# Patient Record
Sex: MALE | Race: WHITE
[De-identification: names, ages, dates, MRNs, and addresses within clinical notes are randomized per-mention and may not be internally consistent; named-entity substitution may affect disease eponyms.]

---

## 2019-07-21 ENCOUNTER — HOSPITAL ENCOUNTER (EMERGENCY)
Dept: HOSPITAL 72 - EMR | Age: 39
Discharge: HOME | End: 2019-07-21
Payer: SELF-PAY

## 2019-07-21 VITALS — SYSTOLIC BLOOD PRESSURE: 106 MMHG | DIASTOLIC BLOOD PRESSURE: 71 MMHG

## 2019-07-21 VITALS — DIASTOLIC BLOOD PRESSURE: 71 MMHG | SYSTOLIC BLOOD PRESSURE: 106 MMHG

## 2019-07-21 VITALS — WEIGHT: 170 LBS | BODY MASS INDEX: 25.18 KG/M2 | HEIGHT: 69 IN

## 2019-07-21 DIAGNOSIS — N50.89: Primary | ICD-10-CM

## 2019-07-21 PROCEDURE — 96372 THER/PROPH/DIAG INJ SC/IM: CPT

## 2019-07-21 PROCEDURE — 99283 EMERGENCY DEPT VISIT LOW MDM: CPT

## 2019-07-21 NOTE — NUR
ED Nurse Note:

Patient medicated for pain and to decrease circulation to affected area. 
Patient tolerated injection well.

## 2019-07-21 NOTE — NUR
ED Nurse Note:

Patient able to ambulate to restroom and void. Patient assisted with lubricant 
to free affected area with no progress. Patient supplied with ice packs to 
decrease swelling in affected area. Will continue to monitor.

## 2019-07-21 NOTE — NUR
ED Nurse Note:

Patient presents with complaints of stuck genital ring, x 1 day. Patient 
reports pain 8/10.

## 2019-07-21 NOTE — NUR
ED Nurse Note:



recieved pt on bed, paramedics on bedside removing the penile ring by a 
mechanical cutter, pt able to tolerate without pain on the process. penile ring 
carefully cut and successfully removed.

## 2019-07-21 NOTE — NUR
ED Nurse Note:

Patient growing increasingly frustrated, is resting comfortably awaiting 
recommendation from [facility].

## 2019-07-21 NOTE — NUR
ED Nurse Note:

Patient reports decreased pain and throbbing sensation post medication 
administration.

## 2019-07-21 NOTE — EMERGENCY ROOM REPORT
History of Present Illness


General


Chief Complaint:  General Complaint


Source:  Patient





Present Illness


HPI


Patient is a 39-year-old male who presented after increased swelling to his 

genitalia.  Patient had placed a large metallic ring around his penis and 

scrotum.  This is becoming increasingly swollen.  He had been able to remove 

this for the past 2 days.  He denies any other current complaints.  Patient had 

been having some continued sensation to the area.  He denies any fever.


Allergies:  


Coded Allergies:  


     No Known Allergies (Unverified , 7/21/19)





Patient History


Reviewed Nursing Documentation:  PMH: Agreed; PSxH: Agreed





Nursing Documentation-PMH


Hx Neurological Problems:  Yes - HI5





Review of Systems


All Other Systems:  negative except mentioned in HPI





Physical Exam





Vital Signs








  Date Time  Temp Pulse Resp B/P (MAP) Pulse Ox O2 Delivery O2 Flow Rate FiO2


 


7/21/19 03:19 97.9 88 16 106/71 (83) 94 Room Air  








General Appearance:  well appearing, no apparent distress, alert, GCS 15, non-

toxic


Head:  normocephalic, atraumatic


ENT:  hearing grossly normal, normal voice


Neck:  full range of motion, supple


Respiratory:  no respiratory distress, speaking full sentences


Genitourinary:  other - scrotal and penile edema


Musculoskeletal:  no calf tenderness


Neurologic:  normal inspection, alert, oriented x3, responsive, CNs III-XII nml 

as tested, normal gait


Psychiatric:  mood/affect normal


Skin:  no rash





Medical Decision Making


Diagnostic Impression:  


 Primary Impression:  


 Penile swelling


ER Course


Patient noted to have marketed penile and scrotal swelling.  Patient's 

genitalia were noted to be circumferentially and closed in a  steel ring.  An 

attempt was made at cutting with a ring cutter without any success.  Attempt is 

made at reduction of scrotal contents and this was also unsuccessful.  Fire 

department was contacted and initially presented with inadequate tools to cut 

the steel ring.  A second fire department squad was contacted who removed the 

ring successfully with cutting tool.  Patient was noted to have no cuts or 

abrasions due to removal attempt.  Patient stated that he felt better after 

removal and wanted to leave.  Patient was advised to follow-up with urology if 

he had any worsening of condition or other concerns.Patient presented for 

penile tourniquet syndrome.  Differential diagnosis include was not limited to 

ischemia, tourniquet, among others.





Last Vital Signs








  Date Time  Temp Pulse Resp B/P (MAP) Pulse Ox O2 Delivery O2 Flow Rate FiO2


 


7/21/19 03:19 97.9 79 16 106/71 94 Room Air  








Status:  improved


Disposition:  HOME, SELF-CARE


Condition:  Stable


Referrals:  


NOT CHOSEN [facility]/MD,REFERRING (PCP)











Car Monreal MD Jul 21, 2019 04:10